# Patient Record
Sex: FEMALE | Race: WHITE | NOT HISPANIC OR LATINO | URBAN - METROPOLITAN AREA
[De-identification: names, ages, dates, MRNs, and addresses within clinical notes are randomized per-mention and may not be internally consistent; named-entity substitution may affect disease eponyms.]

---

## 2017-04-15 ENCOUNTER — APPOINTMENT (OUTPATIENT)
Dept: WOMENS IMAGING | Facility: HOSPITAL | Age: 62
End: 2017-04-15

## 2017-04-15 PROCEDURE — 77063 BREAST TOMOSYNTHESIS BI: CPT | Performed by: RADIOLOGY

## 2017-04-15 PROCEDURE — G0202 SCR MAMMO BI INCL CAD: HCPCS | Performed by: RADIOLOGY

## 2017-04-28 ENCOUNTER — APPOINTMENT (OUTPATIENT)
Dept: WOMENS IMAGING | Facility: HOSPITAL | Age: 62
End: 2017-04-28

## 2017-04-28 PROCEDURE — 76641 ULTRASOUND BREAST COMPLETE: CPT | Performed by: RADIOLOGY

## 2017-11-22 ENCOUNTER — APPOINTMENT (OUTPATIENT)
Dept: WOMENS IMAGING | Facility: HOSPITAL | Age: 62
End: 2017-11-22

## 2017-11-22 PROCEDURE — 77062 BREAST TOMOSYNTHESIS BI: CPT | Performed by: RADIOLOGY

## 2017-11-22 PROCEDURE — 76641 ULTRASOUND BREAST COMPLETE: CPT | Performed by: RADIOLOGY

## 2017-11-22 PROCEDURE — 77066 DX MAMMO INCL CAD BI: CPT | Performed by: RADIOLOGY

## 2018-07-16 ENCOUNTER — APPOINTMENT (OUTPATIENT)
Dept: WOMENS IMAGING | Facility: HOSPITAL | Age: 63
End: 2018-07-16

## 2018-07-16 PROCEDURE — 76641 ULTRASOUND BREAST COMPLETE: CPT | Performed by: RADIOLOGY

## 2018-07-16 PROCEDURE — 77066 DX MAMMO INCL CAD BI: CPT | Performed by: RADIOLOGY

## 2018-07-16 PROCEDURE — 77062 BREAST TOMOSYNTHESIS BI: CPT | Performed by: RADIOLOGY

## 2019-09-21 ENCOUNTER — APPOINTMENT (OUTPATIENT)
Dept: WOMENS IMAGING | Facility: HOSPITAL | Age: 64
End: 2019-09-21

## 2019-09-21 PROCEDURE — 77063 BREAST TOMOSYNTHESIS BI: CPT | Performed by: RADIOLOGY

## 2019-09-21 PROCEDURE — 77067 SCR MAMMO BI INCL CAD: CPT | Performed by: RADIOLOGY

## 2020-10-17 ENCOUNTER — APPOINTMENT (OUTPATIENT)
Dept: WOMENS IMAGING | Facility: HOSPITAL | Age: 65
End: 2020-10-17

## 2020-10-17 PROCEDURE — 77063 BREAST TOMOSYNTHESIS BI: CPT | Performed by: RADIOLOGY

## 2020-10-17 PROCEDURE — 77067 SCR MAMMO BI INCL CAD: CPT | Performed by: RADIOLOGY

## 2021-12-10 ENCOUNTER — APPOINTMENT (OUTPATIENT)
Dept: WOMENS IMAGING | Facility: HOSPITAL | Age: 66
End: 2021-12-10

## 2021-12-10 PROCEDURE — 77067 SCR MAMMO BI INCL CAD: CPT | Performed by: RADIOLOGY

## 2021-12-10 PROCEDURE — 77063 BREAST TOMOSYNTHESIS BI: CPT | Performed by: RADIOLOGY

## 2024-10-09 ENCOUNTER — OFFICE VISIT (OUTPATIENT)
Dept: CARDIOLOGY | Facility: CLINIC | Age: 69
End: 2024-10-09
Payer: MEDICARE

## 2024-10-09 VITALS
DIASTOLIC BLOOD PRESSURE: 70 MMHG | HEART RATE: 62 BPM | SYSTOLIC BLOOD PRESSURE: 134 MMHG | HEIGHT: 65 IN | WEIGHT: 130.8 LBS | BODY MASS INDEX: 21.79 KG/M2 | OXYGEN SATURATION: 95 %

## 2024-10-09 DIAGNOSIS — R09.89 BILATERAL CAROTID BRUITS: ICD-10-CM

## 2024-10-09 DIAGNOSIS — R01.1 HEART MURMUR: ICD-10-CM

## 2024-10-09 DIAGNOSIS — R06.09 DOE (DYSPNEA ON EXERTION): ICD-10-CM

## 2024-10-09 DIAGNOSIS — I10 ESSENTIAL HYPERTENSION: Primary | ICD-10-CM

## 2024-10-09 DIAGNOSIS — E78.2 MIXED HYPERLIPIDEMIA: ICD-10-CM

## 2024-10-09 DIAGNOSIS — R00.2 PALPITATIONS: ICD-10-CM

## 2024-10-09 PROCEDURE — 99204 OFFICE O/P NEW MOD 45 MIN: CPT | Performed by: INTERNAL MEDICINE

## 2024-10-09 PROCEDURE — 3075F SYST BP GE 130 - 139MM HG: CPT | Performed by: INTERNAL MEDICINE

## 2024-10-09 PROCEDURE — 93000 ELECTROCARDIOGRAM COMPLETE: CPT | Performed by: INTERNAL MEDICINE

## 2024-10-09 PROCEDURE — 3078F DIAST BP <80 MM HG: CPT | Performed by: INTERNAL MEDICINE

## 2024-10-09 RX ORDER — LORATADINE 10 MG/1
1 TABLET ORAL DAILY PRN
COMMUNITY

## 2024-10-09 RX ORDER — CARVEDILOL 25 MG/1
25 TABLET ORAL 2 TIMES DAILY WITH MEALS
COMMUNITY

## 2024-10-09 NOTE — PROGRESS NOTES
"Subjective   Maddie Dalton is a 69 y.o. female     Chief Complaint   Patient presents with    Establish Care     Here for eval. Per pcp    Palpitations       PROBLEM LIST:     Palpitations  HTN  Hyperlipidemia  Heart murmur    Denies Rheumatic / Scarlet fever      Specialty Problems    None        HPI:    Ms. Maddie Dalton is a 69-year-old patient of Dr. Delvis Alfaro seen today for evaluation of palpitations.    Ms. Dalton describes that she has had episodic short-lived palpitations for many years.  She states that she knows when palpitations are about to occur but she cannot really explain why.  She will then have a short  episode of rapid heartbeat as if her heart \"has to catch up\".  Symptoms last only several seconds, are not accompanied by chest pain, shortness of breath, or lightheadedness, and have been stable over a period of years.  Symptoms can occur up to 2-3 times a month or as infrequently as 1 episode every 2 to 3 months.  There are no obvious precipitating or ameliorating factors.    Ms. Dalton has had cardiac evaluation in the distant past in about 2005 or 2006 by her best guess.  She describes having an echocardiogram, stress test, and carotid duplex because of neck bruit.  She describes the duplex as demonstrating \"corkscrewing\" of the carotids but she does not describe narrowing or blockage.  Ms. Dalton denies chest pain, orthopnea, PND, or lower extremity edema.  She has not been dizzy, presyncopal, or syncopal.                    PRIOR MEDICATIONS    Current Outpatient Medications on File Prior to Visit   Medication Sig Dispense Refill    carvedilol (COREG) 25 MG tablet Take 1 tablet by mouth 2 (Two) Times a Day With Meals.      Cholecalciferol (D3-1000 PO) Daily.      loratadine (CLARITIN) 10 MG tablet Take 1 tablet by mouth Daily As Needed.      multivitamin with minerals (CENTRUM SILVER 50+WOMEN PO) Take 1 tablet by mouth Daily.      omeprazole (priLOSEC) 20 MG capsule Take 1 capsule by mouth Daily. " "      No current facility-administered medications on file prior to visit.       ALLERGIES:    Wound dressing adhesive    PAST MEDICAL HISTORY:    Past Medical History:   Diagnosis Date    Hyperlipidemia     Hypertension        SURGICAL HISTORY:    Past Surgical History:   Procedure Laterality Date    CHOLECYSTECTOMY      TONSILLECTOMY      TUBAL ABDOMINAL LIGATION         SOCIAL HISTORY:    Social History     Socioeconomic History    Marital status: Unknown   Tobacco Use    Smoking status: Never    Smokeless tobacco: Never   Substance and Sexual Activity    Alcohol use: Never    Drug use: Never       FAMILY HISTORY:    Family History   Problem Relation Age of Onset    Aortic aneurysm Mother     Stroke Father        Review of Systems   Constitutional:  Negative for chills, diaphoresis, fatigue, fever and unexpected weight change.        No physical limitations.    HENT: Negative.     Eyes:  Positive for visual disturbance (glasses).   Respiratory:  Positive for shortness of breath (with hills / inclines).    Cardiovascular:  Positive for palpitations (occas. not daily or even weekly. \"beats fast for a few seconds\", can occur at rest. episodes random., states has had palps for yrs.). Negative for chest pain and leg swelling.   Gastrointestinal:  Positive for diarrhea (r/t cholecyst.). Negative for blood in stool (denies melena, hemoptysis).   Endocrine: Negative for cold intolerance and heat intolerance.   Genitourinary: Negative.    Musculoskeletal:  Positive for arthralgias and myalgias.   Skin: Negative.    Allergic/Immunologic: Positive for environmental allergies.   Neurological: Negative.         Denies stroke like symptoms   Hematological:  Bruises/bleeds easily.   Psychiatric/Behavioral:  Positive for sleep disturbance (off and on).        VISIT VITALS:  Vitals:    10/09/24 1136   BP: 134/70   BP Location: Left arm   Patient Position: Sitting   Pulse: 62   SpO2: 95%   Weight: 59.3 kg (130 lb 12.8 oz) " "  Height: 165.1 cm (65\")      /70 (BP Location: Left arm, Patient Position: Sitting)   Pulse 62   Ht 165.1 cm (65\")   Wt 59.3 kg (130 lb 12.8 oz)   SpO2 95%   BMI 21.77 kg/m²     RECENT LABS:    Objective       Physical Exam  Vitals and nursing note reviewed.   Constitutional:       General: She is not in acute distress.     Appearance: She is well-developed.   HENT:      Head: Normocephalic and atraumatic.   Eyes:      Conjunctiva/sclera: Conjunctivae normal.      Pupils: Pupils are equal, round, and reactive to light.      Comments: No ptosis or lid lag  Extra ocular motions intact     Neck:      Vascular: Carotid bruit (bilat. bruits, louder on rt) present. No hepatojugular reflux or JVD.      Trachea: No tracheal deviation.      Comments: Nl. Carotid upstrokes  Cardiovascular:      Rate and Rhythm: Normal rate and regular rhythm.      Pulses:           Radial pulses are 2+ on the right side and 2+ on the left side.      Heart sounds: Normal heart sounds, S1 normal and S2 normal. Murmur heard.      Systolic murmur is present.      No friction rub. S4 sounds present.      Comments: 1/6 systolic ejection murmur RUSB  1/6 TR  NO MR  NO AI  Pulmonary:      Effort: Pulmonary effort is normal.      Breath sounds: Normal breath sounds. No wheezing, rhonchi or rales.      Comments: Nl. Expir. Phase  Nl. Breath sound intensity  Mild dullness left base  Abdominal:      General: Bowel sounds are normal. There is no distension or abdominal bruit.      Palpations: Abdomen is soft. There is no mass.      Tenderness: There is no abdominal tenderness. There is no guarding or rebound.      Comments: No organomegaly   Musculoskeletal:         General: No tenderness or deformity. Normal range of motion.      Cervical back: Normal range of motion and neck supple.      Right lower leg: No edema.      Left lower leg: No edema.      Comments: BLE, no edema, excellent pedal pulses, no rell. Stasis changes     Skin:     " General: Skin is warm and dry.      Coloration: Skin is not pale.      Findings: No erythema or rash.   Neurological:      Mental Status: She is alert and oriented to person, place, and time.   Psychiatric:         Behavior: Behavior normal.         Thought Content: Thought content normal.         Judgment: Judgment normal.         ECG 12 Lead    Date/Time: 10/9/2024 11:46 AM  Performed by: Trent Galdamez MD    Authorized by: Trent Galdamez MD  Previous ECG: no previous ECG available          Assessment & Plan   #1.  Palpitations.  These have been present for years, are minimally symptomatic, and previous evaluation demonstrated no malignant dysrhythmia as no specific treatment was offered.  However, with a [id number] 2 score of 3 (hypertension, gender, and age) or four if peripheral arterial disease is present full dose anticoagulation would be required for stroke prophylaxis.  Therefore, we will perform 30-day event monitoring.    2.  Controlled systemic hypertension.    3.  Possible peripheral arterial disease.  We will perform arterial duplex study to assess for progression were peripheral arterial disease demonstrated previously or for the interim development of plaque.  If atheroma were present consideration would need to be given to lipid-lowering therapy given a target LDL of 70 per AHA and ACC guidelines.    4.  Exertional dyspnea.  Given age, gender, and hypertension there is a significant possibility that exertional dyspnea could be an anginal equivalent.  We will perform treadmill stress testing to exclude high risk physiology, and echo to assess filling pressures in general, diastolic function, and pulmonary pressures.    5.  Mild dyslipidemia.  See  above.    6.  Ms. Dalton will follow with Dr. Alfaro as instructed we will plan on seeing her in follow-up after testing or on appearing basis as discussed.   Diagnosis Plan   1. Essential hypertension        2. Mixed hyperlipidemia        3.  Palpitations  ECG 12 Lead      4. Bilateral carotid bruits        5. Heart murmur        6. BRAR (dyspnea on exertion)            No follow-ups on file.         Maddie Dalton  reports that she has never smoked. She has never used smokeless tobacco. I have educated her on the risk of diseases from using tobacco products such as cancer, COPD, and heart disease.               BMI is within normal parameters. No other follow-up for BMI required.       Advance Care Planning   ACP discussion was held with the patient during this visit. Patient does not have an advance directive, declines further assistance.                  Electronically signed by:    Scribed for Trent Galdamez MD by Bren Green LPN on October 9, 2024  at 12:11 EDT    I, Trent Galdamez MD personally performed the services described in this documentation as scribed by the above named individual in my presence, and it is both accurate and complete. October 9, 2024 12:11 EDT      Dictated Utilizing Dragon Dictation: Part of this note may be an electronic transcription/translation of spoken language to printed text using the Dragon Dictation System.

## 2024-10-10 ENCOUNTER — PATIENT ROUNDING (BHMG ONLY) (OUTPATIENT)
Dept: CARDIOLOGY | Facility: CLINIC | Age: 69
End: 2024-10-10
Payer: MEDICARE

## 2024-10-10 NOTE — PROGRESS NOTES
October 10, 2024    Hello, may I speak with Maddie Dalton?    My name is JANETTE MORTENSEN      I am  with [facility]  [address] [place] [id number] [phone].    Before we get started may I verify your date of birth? 1955    I am calling to officially welcome you to our practice and ask about your recent visit. Is this a good time to talk? yes    Tell me about your visit with us. What things went well?  IT ALL WENT WELL,  I WAS VERY PLEASED WITH THE AMOUNT OF TIME HE SPENT WITH ME AND EVERYTHING.         We're always looking for ways to make our patients' experiences even better. Do you have recommendations on ways we may improve?  no    Overall were you satisfied with your first visit to our practice? yes       I appreciate you taking the time to speak with me today. Is there anything else I can do for you? no      Thank you, and have a great day.

## 2024-11-12 ENCOUNTER — HOSPITAL ENCOUNTER (OUTPATIENT)
Dept: CARDIOLOGY | Facility: HOSPITAL | Age: 69
Discharge: HOME | End: 2024-11-12
Payer: MEDICARE

## 2024-11-12 ENCOUNTER — APPOINTMENT (OUTPATIENT)
Dept: CARDIOLOGY | Facility: HOSPITAL | Age: 69
End: 2024-11-12
Payer: MEDICARE

## 2024-11-12 DIAGNOSIS — R01.1 HEART MURMUR: ICD-10-CM

## 2024-11-12 DIAGNOSIS — I10 ESSENTIAL HYPERTENSION: ICD-10-CM

## 2024-11-12 DIAGNOSIS — E78.2 MIXED HYPERLIPIDEMIA: ICD-10-CM

## 2024-11-12 DIAGNOSIS — R00.2 PALPITATIONS: ICD-10-CM

## 2024-11-12 DIAGNOSIS — R06.09 DOE (DYSPNEA ON EXERTION): ICD-10-CM

## 2024-11-12 DIAGNOSIS — R09.89 BILATERAL CAROTID BRUITS: ICD-10-CM

## 2024-11-12 LAB
BH CV ECHO MEAS - ACS: 1.94 CM
BH CV ECHO MEAS - AO MAX PG: 4.7 MMHG
BH CV ECHO MEAS - AO MEAN PG: 2.7 MMHG
BH CV ECHO MEAS - AO ROOT DIAM: 2.9 CM
BH CV ECHO MEAS - AO V2 MAX: 108.6 CM/SEC
BH CV ECHO MEAS - AO V2 VTI: 29.6 CM
BH CV ECHO MEAS - EDV(CUBED): 45.9 ML
BH CV ECHO MEAS - EDV(MOD-SP4): 68.2 ML
BH CV ECHO MEAS - EF(MOD-SP4): 65 %
BH CV ECHO MEAS - EF_3D-VOL: 64 %
BH CV ECHO MEAS - ESV(CUBED): 12.3 ML
BH CV ECHO MEAS - ESV(MOD-SP4): 23.9 ML
BH CV ECHO MEAS - FS: 35.5 %
BH CV ECHO MEAS - IVS/LVPW: 0.9 CM
BH CV ECHO MEAS - IVSD: 1.32 CM
BH CV ECHO MEAS - LA DIMENSION: 3.6 CM
BH CV ECHO MEAS - LAT PEAK E' VEL: 8.2 CM/SEC
BH CV ECHO MEAS - LV DIASTOLIC VOL/BSA (35-75): 41.4 CM2
BH CV ECHO MEAS - LV MASS(C)D: 177.5 GRAMS
BH CV ECHO MEAS - LV SYSTOLIC VOL/BSA (12-30): 14.5 CM2
BH CV ECHO MEAS - LVIDD: 3.6 CM
BH CV ECHO MEAS - LVIDS: 2.31 CM
BH CV ECHO MEAS - LVPWD: 1.47 CM
BH CV ECHO MEAS - MED PEAK E' VEL: 6.1 CM/SEC
BH CV ECHO MEAS - MV A MAX VEL: 84.8 CM/SEC
BH CV ECHO MEAS - MV DEC TIME: 0.21 SEC
BH CV ECHO MEAS - MV E MAX VEL: 93 CM/SEC
BH CV ECHO MEAS - MV E/A: 1.1
BH CV ECHO MEAS - RAP SYSTOLE: 10 MMHG
BH CV ECHO MEAS - RVDD: 3.2 CM
BH CV ECHO MEAS - RVSP: 32.6 MMHG
BH CV ECHO MEAS - SV(MOD-SP4): 44.3 ML
BH CV ECHO MEAS - SVI(MOD-SP4): 26.9 ML/M2
BH CV ECHO MEAS - TR MAX PG: 22.6 MMHG
BH CV ECHO MEAS - TR MAX VEL: 237.5 CM/SEC
BH CV ECHO MEASUREMENTS AVERAGE E/E' RATIO: 13.01
BH CV XLRA MEAS LEFT CAROTID BULB EDV: 17.7 CM/SEC
BH CV XLRA MEAS LEFT CAROTID BULB PSV: 62.9 CM/SEC
BH CV XLRA MEAS LEFT DIST CCA EDV: 18.7 CM/SEC
BH CV XLRA MEAS LEFT DIST CCA PSV: 71.3 CM/SEC
BH CV XLRA MEAS LEFT DIST ICA EDV: -37.2 CM/SEC
BH CV XLRA MEAS LEFT DIST ICA PSV: -107 CM/SEC
BH CV XLRA MEAS LEFT ICA/CCA RATIO: 1.5
BH CV XLRA MEAS LEFT MID ICA EDV: -31 CM/SEC
BH CV XLRA MEAS LEFT MID ICA PSV: -103 CM/SEC
BH CV XLRA MEAS LEFT PROX CCA EDV: 21.2 CM/SEC
BH CV XLRA MEAS LEFT PROX CCA PSV: 85.5 CM/SEC
BH CV XLRA MEAS LEFT PROX ECA PSV: -72.7 CM/SEC
BH CV XLRA MEAS LEFT PROX ICA EDV: -32.9 CM/SEC
BH CV XLRA MEAS LEFT PROX ICA PSV: -97.5 CM/SEC
BH CV XLRA MEAS LEFT VERTEBRAL A EDV: -16.1 CM/SEC
BH CV XLRA MEAS LEFT VERTEBRAL A PSV: -65.2 CM/SEC
BH CV XLRA MEAS RIGHT CAROTID BULB EDV: -19.3 CM/SEC
BH CV XLRA MEAS RIGHT CAROTID BULB PSV: -72.7 CM/SEC
BH CV XLRA MEAS RIGHT DIST CCA EDV: -23 CM/SEC
BH CV XLRA MEAS RIGHT DIST CCA PSV: -90.7 CM/SEC
BH CV XLRA MEAS RIGHT DIST ICA EDV: -35.3 CM/SEC
BH CV XLRA MEAS RIGHT DIST ICA PSV: -144 CM/SEC
BH CV XLRA MEAS RIGHT ICA/CCA RATIO: 1.6
BH CV XLRA MEAS RIGHT MID ICA EDV: -44.2 CM/SEC
BH CV XLRA MEAS RIGHT MID ICA PSV: -132 CM/SEC
BH CV XLRA MEAS RIGHT PROX CCA EDV: 19.3 CM/SEC
BH CV XLRA MEAS RIGHT PROX CCA PSV: 102 CM/SEC
BH CV XLRA MEAS RIGHT PROX ECA PSV: -78.9 CM/SEC
BH CV XLRA MEAS RIGHT PROX ICA EDV: -18.6 CM/SEC
BH CV XLRA MEAS RIGHT PROX ICA PSV: -74.6 CM/SEC
BH CV XLRA MEAS RIGHT VERTEBRAL A EDV: -17.1 CM/SEC
BH CV XLRA MEAS RIGHT VERTEBRAL A PSV: -70.7 CM/SEC
LEFT ATRIUM VOLUME INDEX: 29.4 ML/M2

## 2024-11-12 PROCEDURE — 93880 EXTRACRANIAL BILAT STUDY: CPT

## 2024-11-12 PROCEDURE — 93306 TTE W/DOPPLER COMPLETE: CPT

## 2024-11-26 ENCOUNTER — TELEPHONE (OUTPATIENT)
Dept: CARDIOLOGY | Facility: CLINIC | Age: 69
End: 2024-11-26
Payer: MEDICARE

## 2024-11-26 NOTE — TELEPHONE ENCOUNTER
US CAROTID/ECHO  Pt notified of no acute findings. Provider will discuss results at f/u. Pt reminded of appt date and time.  ----- Message from Trent Galdamez sent at 11/26/2024  2:10 PM EST -----  Follow-up appointment as scheduled.  ----- Message -----  From: Trent Galdamez MD  Sent: 11/24/2024   7:40 PM EST  To: Trent Galdamez MD

## 2024-12-03 ENCOUNTER — HOSPITAL ENCOUNTER (OUTPATIENT)
Dept: CARDIOLOGY | Facility: HOSPITAL | Age: 69
Discharge: HOME | End: 2024-12-03
Payer: MEDICARE

## 2024-12-03 DIAGNOSIS — R01.1 HEART MURMUR: ICD-10-CM

## 2024-12-03 DIAGNOSIS — E78.2 MIXED HYPERLIPIDEMIA: ICD-10-CM

## 2024-12-03 DIAGNOSIS — R00.2 PALPITATIONS: ICD-10-CM

## 2024-12-03 DIAGNOSIS — I10 ESSENTIAL HYPERTENSION: ICD-10-CM

## 2024-12-03 DIAGNOSIS — R06.09 DOE (DYSPNEA ON EXERTION): ICD-10-CM

## 2024-12-03 PROCEDURE — A9500 TC99M SESTAMIBI: HCPCS | Performed by: INTERNAL MEDICINE

## 2024-12-03 PROCEDURE — 34310000005 TECHNETIUM SESTAMIBI: Performed by: INTERNAL MEDICINE

## 2024-12-03 PROCEDURE — 93017 CV STRESS TEST TRACING ONLY: CPT

## 2024-12-03 PROCEDURE — 78452 HT MUSCLE IMAGE SPECT MULT: CPT

## 2024-12-03 RX ADMIN — TECHNETIUM TC 99M SESTAMIBI 1 DOSE: 1 INJECTION INTRAVENOUS at 08:13

## 2024-12-03 RX ADMIN — TECHNETIUM TC 99M SESTAMIBI 1 DOSE: 1 INJECTION INTRAVENOUS at 09:55

## 2024-12-07 LAB
BH CV REST NUCLEAR ISOTOPE DOSE: 10 MCI
BH CV STRESS DURATION MIN STAGE 1: 3
BH CV STRESS DURATION SEC STAGE 1: 0
BH CV STRESS GRADE STAGE 1: 10
BH CV STRESS METS STAGE 1: 5
BH CV STRESS NUCLEAR ISOTOPE DOSE: 30 MCI
BH CV STRESS PROTOCOL 1: NORMAL
BH CV STRESS RECOVERY BP: NORMAL MMHG
BH CV STRESS RECOVERY HR: 80 BPM
BH CV STRESS SPEED STAGE 1: 1.7
BH CV STRESS STAGE 1: 1
MAXIMAL PREDICTED HEART RATE: 149 BPM
PERCENT MAX PREDICTED HR: 81.21 %
STRESS BASELINE BP: NORMAL MMHG
STRESS BASELINE HR: 71 BPM
STRESS PERCENT HR: 96 %
STRESS POST ESTIMATED WORKLOAD: 4.6 METS
STRESS POST EXERCISE DUR MIN: 4 MIN
STRESS POST PEAK BP: NORMAL MMHG
STRESS POST PEAK HR: 121 BPM
STRESS TARGET HR: 127 BPM

## 2024-12-09 ENCOUNTER — TELEPHONE (OUTPATIENT)
Dept: CARDIOLOGY | Facility: CLINIC | Age: 69
End: 2024-12-09
Payer: MEDICARE

## 2024-12-09 NOTE — TELEPHONE ENCOUNTER
PATIENT NOTIFIED OF STRESS TEST RESULTS AND WILL CALLED WITH A SOONER APPT. MSG. SENT TO [facility] TO SCHEDULE AND CALL. PH,LPN        ----- Message from Trent Galdamez sent at 12/9/2024  1:01 PM EST -----  Office follow-up 1 to 2 weeks  ----- Message -----  From: Trent Galdamez MD  Sent: 12/7/2024  11:43 AM EST  To: Trent Galdamez MD

## 2024-12-13 ENCOUNTER — OFFICE VISIT (OUTPATIENT)
Dept: CARDIOLOGY | Facility: CLINIC | Age: 69
End: 2024-12-13
Payer: MEDICARE

## 2024-12-13 VITALS
OXYGEN SATURATION: 98 % | WEIGHT: 134 LBS | BODY MASS INDEX: 22.33 KG/M2 | HEART RATE: 74 BPM | SYSTOLIC BLOOD PRESSURE: 135 MMHG | HEIGHT: 65 IN | DIASTOLIC BLOOD PRESSURE: 64 MMHG

## 2024-12-13 DIAGNOSIS — R06.09 DOE (DYSPNEA ON EXERTION): ICD-10-CM

## 2024-12-13 DIAGNOSIS — R09.89 BILATERAL CAROTID BRUITS: ICD-10-CM

## 2024-12-13 DIAGNOSIS — E78.5 DYSLIPIDEMIA: ICD-10-CM

## 2024-12-13 DIAGNOSIS — I10 ESSENTIAL HYPERTENSION: Primary | ICD-10-CM

## 2024-12-13 DIAGNOSIS — E78.2 MIXED HYPERLIPIDEMIA: ICD-10-CM

## 2024-12-13 DIAGNOSIS — R94.39 ABNORMAL NUCLEAR STRESS TEST: ICD-10-CM

## 2024-12-13 DIAGNOSIS — I35.1 NONRHEUMATIC AORTIC VALVE REGURGITATION: ICD-10-CM

## 2024-12-13 DIAGNOSIS — R00.2 PALPITATIONS: ICD-10-CM

## 2024-12-13 PROBLEM — R73.03 PREDIABETES: Status: ACTIVE | Noted: 2024-12-13

## 2024-12-13 RX ORDER — ROSUVASTATIN CALCIUM 10 MG/1
10 TABLET, COATED ORAL DAILY
Qty: 90 TABLET | Refills: 3 | Status: SHIPPED | OUTPATIENT
Start: 2024-12-13

## 2024-12-13 RX ORDER — METOPROLOL TARTRATE 50 MG
50 TABLET ORAL ONCE
Qty: 1 TABLET | Refills: 0 | Status: SHIPPED | OUTPATIENT
Start: 2024-12-13 | End: 2024-12-13

## 2024-12-13 RX ORDER — METOPROLOL SUCCINATE 50 MG/1
50 TABLET, EXTENDED RELEASE ORAL DAILY
Qty: 2 TABLET | Refills: 0 | Status: SHIPPED | OUTPATIENT
Start: 2024-12-13

## 2024-12-13 NOTE — PROGRESS NOTES
Subjective   Follow up, stress test, echocardiogram and carotid Doppler results  Maddie Dalton is a 69 y.o. female who presents to day for Follow-up (Cardiac management -2 month visit - last seen by Dr. Galdamez //Labs- last set 9/11/2024//Medication- ) and Hypertension (Review Echo , Stress , Carotid ultrasound ).    CHIEF COMPLIANT  Chief Complaint   Patient presents with    Follow-up     Cardiac management -2 month visit - last seen by Dr. Galdamez     Labs- last set 9/11/2024    Medication-     Hypertension     Review Echo , Stress , Carotid ultrasound        Active Problems:  Problem List Items Addressed This Visit          Cardiac and Vasculature    Palpitations    Essential hypertension - Primary    Relevant Medications    metoprolol succinate XL (TOPROL-XL) 50 MG 24 hr tablet    Mixed hyperlipidemia    Relevant Medications    rosuvastatin (CRESTOR) 10 MG tablet    Bilateral carotid bruits    BRAR (dyspnea on exertion)    Nonrheumatic aortic valve regurgitation    Relevant Medications    metoprolol succinate XL (TOPROL-XL) 50 MG 24 hr tablet    Dyslipidemia    Abnormal nuclear stress test       HPI  HPI    History of Present Illness  The patient presents for evaluation of abnormal stress test, carotid artery plaque, hypertension, prediabetes, palpitations, and osteoporosis.    She reports a general sense of well-being, with no complaints of chest pressure or pain. She experiences occasional palpitations, a symptom she has been managing for the past 30 years. These episodes are characterized by a brief period of rapid heart rate, which subsequently normalizes. Her shortness of breath remains unchanged since her last consultation with Dr. Galdamez. She has been informed that her echocardiogram and carotid artery tests were within normal limits. However, she was also made aware of a slight twist in her carotid artery. She maintains a diet low in salt and has never engaged in smoking. Her high-density lipoprotein  levels are elevated, and her total cholesterol was recorded at 200 during her last visit. She is uncertain about her low-density lipoprotein levels. She occasionally experiences chest discomfort while sitting, but not during ambulation. She expresses concern about the potential impact of her dense breasts on her stress test results. She recalls being asked to increase her speed during the last minute of her stress test, but she declined due to hip and leg discomfort. She also mentions a foot injury, specifically a swollen toe, which she believes may be affecting her gait.    Her blood pressure readings have been slightly elevated, with a recent reading of 135/64. She is currently on a regimen of carvedilol 25 mg, administered in the morning. Her home blood pressure readings typically range from 125 to 130 systolic and 70 to 75 diastolic, with nighttime readings consistently in the low 70s. She does not require any medication refills at this time.    She has a diagnosis of osteoporosis and is curious about its potential to exert pressure on her heart.    She is prediabetic. She tries to eat a diabetic diet.    SOCIAL HISTORY  She has never smoked.    FAMILY HISTORY  She does not have a family history of diabetes.    MEDICATIONS  carvedilol       PRIOR MEDS  Current Outpatient Medications on File Prior to Visit   Medication Sig Dispense Refill    carvedilol (COREG) 25 MG tablet Take 1 tablet by mouth 2 (Two) Times a Day With Meals.      Cholecalciferol (D3-1000 PO) Daily.      loratadine (CLARITIN) 10 MG tablet Take 1 tablet by mouth Daily As Needed.      multivitamin with minerals (CENTRUM SILVER 50+WOMEN PO) Take 1 tablet by mouth Daily.      omeprazole (priLOSEC) 20 MG capsule Take 1 capsule by mouth Daily.       No current facility-administered medications on file prior to visit.       ALLERGIES  Wound dressing adhesive    HISTORY  Past Medical History:   Diagnosis Date    Hyperlipidemia     Hypertension   "      Social History     Socioeconomic History    Marital status:    Tobacco Use    Smoking status: Never    Smokeless tobacco: Never   Vaping Use    Vaping status: Never Used   Substance and Sexual Activity    Alcohol use: Never    Drug use: Never       Family History   Problem Relation Age of Onset    Aortic aneurysm Mother     Stroke Father        Review of Systems   Constitutional: Negative.  Negative for chills, fatigue and fever.   HENT: Negative.  Negative for congestion, rhinorrhea and sore throat.    Eyes:  Positive for visual disturbance.   Respiratory:  Negative for chest tightness, shortness of breath and wheezing.    Cardiovascular:  Positive for palpitations. Negative for chest pain and leg swelling.   Gastrointestinal: Negative.    Endocrine: Negative.    Genitourinary: Negative.    Musculoskeletal:  Positive for arthralgias and back pain. Negative for neck pain.   Skin: Negative.    Allergic/Immunologic: Positive for environmental allergies.   Neurological: Negative.  Negative for dizziness, weakness, numbness and headaches.   Hematological:  Bruises/bleeds easily.   Psychiatric/Behavioral: Negative.  Negative for sleep disturbance.        Objective     VITALS: /64 (BP Location: Right arm, Patient Position: Sitting, Cuff Size: Adult)   Pulse 74   Ht 165.1 cm (65\")   Wt 60.8 kg (134 lb)   SpO2 98%   BMI 22.30 kg/m²     LABS:   Lab Results (most recent)       None            IMAGING:   No Images in the past 120 days found..    EXAM:  Physical Exam  Vitals reviewed.   Constitutional:       Appearance: She is well-developed.   HENT:      Head: Normocephalic and atraumatic.   Eyes:      Pupils: Pupils are equal, round, and reactive to light.   Neck:      Thyroid: No thyromegaly.      Vascular: Carotid bruit present. No JVD.   Cardiovascular:      Rate and Rhythm: Normal rate and regular rhythm.      Heart sounds: Normal heart sounds. No murmur heard.     No friction rub. No gallop. "   Pulmonary:      Effort: Pulmonary effort is normal. No respiratory distress.      Breath sounds: Normal breath sounds. No stridor. No wheezing or rales.   Chest:      Chest wall: No tenderness.   Musculoskeletal:         General: No tenderness or deformity.      Cervical back: Neck supple.   Skin:     General: Skin is warm and dry.   Neurological:      Mental Status: She is alert and oriented to person, place, and time.      Cranial Nerves: No cranial nerve deficit.      Coordination: Coordination normal.       Physical Exam  Vital Signs  Blood pressure is 135/64. Heart rate is 74.       Procedure   Procedures      Results  Laboratory Studies  Hemoglobin A1c is 5.7. HDL cholesterol is 68, LDL cholesterol is 116, triglycerides are 96.    Imaging  Echocardiogram showed normal heart strength, thickened heart muscle, enlarged top chamber, and mild leakage in the aortic valve. Carotid artery Doppler showed widely patent arteries with a little bit of plaque on the right side. Stress test showed a small defect shadow on the front part of the heart and another shadow on the sides of the heart.    Testing  Heart monitor showed average heart rate was normal, no arrhythmia or atrial fibrillation.       Assessment & Plan     Diagnoses and all orders for this visit:    1. Essential hypertension (Primary)  -     metoprolol succinate XL (TOPROL-XL) 50 MG 24 hr tablet; Take 1 tablet by mouth Daily.  Dispense: 2 tablet; Refill: 0    2. Mixed hyperlipidemia  -     rosuvastatin (CRESTOR) 10 MG tablet; Take 1 tablet by mouth Daily.  Dispense: 90 tablet; Refill: 3    3. Palpitations    4. Bilateral carotid bruits    5. Nonrheumatic aortic valve regurgitation    6. Dyslipidemia    7. BRAR (dyspnea on exertion)    8. Abnormal nuclear stress test      Assessment & Plan  1. Abnormal stress test.  The stress test results indicate a potential ischemia, despite the absence of chest pain. The echocardiogram revealed a mild leakage in the  aortic valve, which will be closely monitored. The heart monitor did not detect any arrhythmias, which is a positive outcome. A CT scan of the heart will be conducted to further investigate the possibility of a blockage. She will commence a daily regimen of aspirin 81 mg, which can be purchased over-the-counter. A prescription for rosuvastatin 10 mg daily has been provided and sent to [facility]. She is advised to bring metoprolol to the CT scan appointment, to be taken only if her heart rate exceeds 70. If the CT scan reveals significant blockages, further intervention such as heart catheterization may be considered.    2. Carotid artery plaque.  The carotid artery shows a little plaque but no significant blockage. She is advised to keep her cholesterol levels down to prevent further plaque buildup. A prescription for rosuvastatin 10 mg daily has been provided and sent to [facility] pharmacy.    3. Hypertension.  Her blood pressure is well-controlled with carvedilol 25 mg taken in the morning. She is advised to continue with the current medication regimen.    4. Prediabetes.  Her hemoglobin A1c is slightly elevated at 5.7, indicating prediabetes. She is advised to watch her carbohydrate intake and maintain a healthy diet to prevent progression to diabetes.    5. Palpitations.  She reports occasional palpitations, which she has experienced for 30 years. The heart monitor did not show any arrhythmias. She is advised to monitor her symptoms and report any changes.    6. Osteoporosis.  She has osteoporosis and reports back pain. She is advised that while poor posture can put pressure on the heart over many years, her current condition is not causing her heart issues.    Follow-up  The patient will follow up in 1 month.       Return in about 4 weeks (around 1/10/2025).      Advance Care Planning   ACP discussion was held with the patient during this visit. Patient does not have an advance directive, declines further  assistance.             MEDS ORDERED DURING VISIT:  New Medications Ordered This Visit   Medications    rosuvastatin (CRESTOR) 10 MG tablet     Sig: Take 1 tablet by mouth Daily.     Dispense:  90 tablet     Refill:  3    metoprolol succinate XL (TOPROL-XL) 50 MG 24 hr tablet     Sig: Take 1 tablet by mouth Daily.     Dispense:  2 tablet     Refill:  0         As always, Delvis Blanco MD  I appreciate very much the opportunity to participate in the cardiovascular care of your patients. Please do not hesitate to call me with any questions with regards to Maddie Dalton evaluation and management.     Patient or patient representative verbalized consent for the use of Ambient Listening during the visit with  Yordy George MD for chart documentation. 12/13/2024  09:08 EST       This document has been electronically signed by Yordy George MD  December 13, 2024 09:49 EST    This note is dictated utilizing voice recognition software.

## 2025-01-02 DIAGNOSIS — R94.39 ABNORMAL NUCLEAR STRESS TEST: ICD-10-CM

## 2025-01-15 ENCOUNTER — OFFICE VISIT (OUTPATIENT)
Dept: CARDIOLOGY | Facility: CLINIC | Age: 70
End: 2025-01-15
Payer: MEDICARE

## 2025-01-15 VITALS
WEIGHT: 131.6 LBS | HEIGHT: 65 IN | BODY MASS INDEX: 21.92 KG/M2 | OXYGEN SATURATION: 96 % | HEART RATE: 71 BPM | DIASTOLIC BLOOD PRESSURE: 68 MMHG | SYSTOLIC BLOOD PRESSURE: 136 MMHG

## 2025-01-15 DIAGNOSIS — I10 ESSENTIAL HYPERTENSION: ICD-10-CM

## 2025-01-15 DIAGNOSIS — R00.2 PALPITATIONS: ICD-10-CM

## 2025-01-15 DIAGNOSIS — E78.2 MIXED HYPERLIPIDEMIA: ICD-10-CM

## 2025-01-15 DIAGNOSIS — R09.89 BILATERAL CAROTID BRUITS: ICD-10-CM

## 2025-01-15 DIAGNOSIS — Q24.5 ANOMALOUS RIGHT CORONARY ARTERY: Primary | ICD-10-CM

## 2025-01-15 PROCEDURE — 3075F SYST BP GE 130 - 139MM HG: CPT | Performed by: CLINICAL NURSE SPECIALIST

## 2025-01-15 PROCEDURE — 3078F DIAST BP <80 MM HG: CPT | Performed by: CLINICAL NURSE SPECIALIST

## 2025-01-15 PROCEDURE — 99214 OFFICE O/P EST MOD 30 MIN: CPT | Performed by: CLINICAL NURSE SPECIALIST

## 2025-01-15 NOTE — PROGRESS NOTES
Subjective     Maddie Dalton is a 69 y.o. female who presents today for Follow-up (Testing (Dr. George) ).    CHIEF COMPLIANT  Chief Complaint   Patient presents with    Follow-up     Testing (Dr. George)        Active Problems:   1. Palpitations: No significant findings on mobile cardiac telemetry monitor 10/2024    2. Essential hypertension - Primary    3. Mixed hyperlipidemia    4. Bilateral carotid bruits: No evidence of hemodynamically significant carotid stenosis per carotid Doppler 11/12/2024    5. BRAR (dyspnea on exertion)    6. Echocardiogram 11/12/2024: EF 60 to 65%, grade 2 diastolic dysfunction.  Mildly jet of MR directed superomedially along the intra-atrial septum.  Mild AI, trivial to mild TR.  PA systolic pressure estimated in the low 30s    7. Dyslipidemia    8. Abnormal nuclear stress test 12/3/24: small moderately dense incompletely reversible defect involving the distal anterior wall. This finding is felt most compatible with chest wall attenuation. Additionally there is a moderately sized, mildly dense but almost completely reversible anterolateral and lateral wall defect compatible with ischemia.    9.  Coronary CTA 12/27/24 : No significant coronary artery formation.  The left coronary artery is unremarkable.  There is focal kinking of the proximal right coronary artery by the pulmonary outflow tract which could potentially cause blood flow restriction       HPI  The patient is a 69-year-old female that returns for follow-up to discuss recent coronary CTA.  The patient underwent a nuclear stress test on 12/3/2024 which showed a small moderately dense incompletely reversible defect involving the distal anterior wall. This finding is felt most compatible with chest wall attenuation. Additionally there is a moderately sized, mildly dense but almost completely reversible anterolateral and lateral wall defect compatible with ischemia.  She was reevaluated in the office with Dr. George and scheduled for  a coronary CTA for further evaluation which was completed on 5/27/2024.  This showed no significant coronary artery formation.  The left coronary artery is unremarkable.  There is focal kinking of the proximal right coronary artery by the pulmonary outflow tract which could potentially cause blood flow restriction.   The patient denies significant chest pain but states she will have some mild tightness in her chest and is still having the occasional palpitation.  These palpitations symptoms have been occurring for quite some time.  She did wear a cardiac monitor in October which showed no significant findings.  She denies syncope or near syncope.  She does continue to have some exertional dyspnea.    PRIOR MEDS  Current Outpatient Medications on File Prior to Visit   Medication Sig Dispense Refill    carvedilol (COREG) 25 MG tablet Take 1 tablet by mouth 2 (Two) Times a Day With Meals.      Cholecalciferol (D3-1000 PO) Daily.      loratadine (CLARITIN) 10 MG tablet Take 1 tablet by mouth Daily As Needed.      metoprolol succinate XL (TOPROL-XL) 50 MG 24 hr tablet Take 1 tablet by mouth Daily. 2 tablet 0    multivitamin with minerals (CENTRUM SILVER 50+WOMEN PO) Take 1 tablet by mouth Daily.      omeprazole (priLOSEC) 20 MG capsule Take 1 capsule by mouth Daily.      rosuvastatin (CRESTOR) 10 MG tablet Take 1 tablet by mouth Daily. 90 tablet 3    metoprolol tartrate (LOPRESSOR) 50 MG tablet Take 1 tablet by mouth 1 (One) Time for 1 dose. Take 50 mg One (1) Hour Prior to Coronary CTA 1 tablet 0     No current facility-administered medications on file prior to visit.       ALLERGIES  Wound dressing adhesive    HISTORY  Past Medical History:   Diagnosis Date    Hyperlipidemia     Hypertension        Social History     Socioeconomic History    Marital status:    Tobacco Use    Smoking status: Never    Smokeless tobacco: Never   Vaping Use    Vaping status: Never Used   Substance and Sexual Activity    Alcohol use:  "Never    Drug use: Never       Family History   Problem Relation Age of Onset    Aortic aneurysm Mother     Stroke Father        Review of Systems   Constitutional:  Positive for fatigue.   Eyes:  Positive for visual disturbance (Glasses daily).   Respiratory:  Negative for chest tightness and shortness of breath.    Cardiovascular:  Positive for palpitations (States like sometimes her heart feels like it has to \"catch up\" so it beats really fast for a little bit). Negative for chest pain and leg swelling.   Neurological:  Negative for dizziness, syncope, weakness, numbness and headaches.   Hematological:  Bruises/bleeds easily.       Objective     VITALS: /68   Pulse 71   Ht 165.1 cm (65\")   Wt 59.7 kg (131 lb 9.6 oz)   SpO2 96%   BMI 21.90 kg/m²     LABS:   Lab Results (most recent)       None            IMAGING:   No Images in the past 120 days found..    EXAM:  Physical Exam  Constitutional:       Appearance: Normal appearance.   Eyes:      Pupils: Pupils are equal, round, and reactive to light.   Cardiovascular:      Rate and Rhythm: Normal rate and regular rhythm.      Pulses:           Carotid pulses are 2+ on the right side with bruit and 2+ on the left side with bruit.       Radial pulses are 2+ on the right side and 2+ on the left side.        Dorsalis pedis pulses are 2+ on the right side and 2+ on the left side.        Posterior tibial pulses are 2+ on the right side and 2+ on the left side.      Heart sounds: Murmur (1/6 systolic) heard.   Pulmonary:      Effort: Pulmonary effort is normal.      Breath sounds: Normal breath sounds.   Abdominal:      General: Bowel sounds are normal.      Palpations: Abdomen is soft.   Musculoskeletal:      Right lower leg: No edema.      Left lower leg: No edema.   Skin:     General: Skin is warm and dry.      Capillary Refill: Capillary refill takes less than 2 seconds.   Neurological:      General: No focal deficit present.      Mental Status: She is alert " and oriented to person, place, and time.   Psychiatric:         Mood and Affect: Mood normal.         Thought Content: Thought content normal.         Procedure   Procedures       Assessment & Plan    Diagnosis Plan   1. Anomalous right coronary artery        2. Essential hypertension        3. Palpitations        4. Mixed hyperlipidemia        5. Bilateral carotid bruits          Plan:  Anomalous RCA: Coronary CTA showed focal kinking of the proximal RCA by pulmonary outflow track which could potentially cause blood flow restriction.  Will make referral to Dr. Trent Espinoza for further evaluation and treatment of this.  This plan of care was discussed in detail with the patient and she was agreeable. Will have imaging shared with Dr Espinoza.     Essential hypertension: Continue current antihypertensives.  The patient was instructed to monitor BP at home and to notify our office if systolic BP is consistently greater than 130-135 systolic.  Goal BP is 130-135/70-80.  3.  Palpitations: Continue beta-blocker.  There were no significant findings on recent cardiac monitor.  4.  Mixed hyperlipidemia: Will continue rosuvastatin.  Will periodically review lipid panel.  5.  Bilateral carotid bruits: No hemodynamically significant stenosis per recent carotid Doppler.    Return in about 3 months (around 4/15/2025).    Maddie Dalton  reports that she has never smoked. She has never used smokeless tobacco.       BMI is within normal parameters. No other follow-up for BMI required.    Advance Care Planning  ACP discussion was held with the patient during this visit. Patient does not have an advance directive, declines further assistance.       MEDS ORDERED DURING VISIT:  No orders of the defined types were placed in this encounter.      DISCONTINUED MEDS DURING VISIT:   There are no discontinued medications.       This document has been electronically signed by SHANA Matthew  January 16, 2025 07:53 EST    Dictated  Utilizing Dragon Dictation: Part of this note may be an electronic transcription/translation of spoken language to printed text using the Dragon Dictation System

## 2025-02-18 ENCOUNTER — TELEPHONE (OUTPATIENT)
Dept: CARDIOLOGY | Facility: CLINIC | Age: 70
End: 2025-02-18
Payer: MEDICARE

## 2025-02-18 NOTE — TELEPHONE ENCOUNTER
Dr. Espnioza reviewed patient's referral and chart. He states he cannot see the patient due to her diagnosis, he recommends [facility]. Do I have your consent to change referral to [facility]?